# Patient Record
Sex: MALE | Race: WHITE | Employment: FULL TIME | URBAN - METROPOLITAN AREA
[De-identification: names, ages, dates, MRNs, and addresses within clinical notes are randomized per-mention and may not be internally consistent; named-entity substitution may affect disease eponyms.]

---

## 2022-02-11 ENCOUNTER — HOSPITAL ENCOUNTER (EMERGENCY)
Age: 67
Discharge: HOME | End: 2022-02-11
Payer: COMMERCIAL

## 2022-02-11 ENCOUNTER — APPOINTMENT (OUTPATIENT)
Dept: GENERAL RADIOLOGY | Age: 67
End: 2022-02-11
Payer: COMMERCIAL

## 2022-02-11 VITALS
RESPIRATION RATE: 16 BRPM | BODY MASS INDEX: 35.12 KG/M2 | WEIGHT: 265 LBS | OXYGEN SATURATION: 95 % | HEIGHT: 73 IN | HEART RATE: 108 BPM | SYSTOLIC BLOOD PRESSURE: 138 MMHG | DIASTOLIC BLOOD PRESSURE: 100 MMHG | TEMPERATURE: 98 F

## 2022-02-11 DIAGNOSIS — S66.912A STRAIN OF LEFT WRIST, INITIAL ENCOUNTER: Primary | ICD-10-CM
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
## 2022-02-11 PROCEDURE — 99284 EMERGENCY DEPT VISIT MOD MDM: CPT

## 2022-02-11 PROCEDURE — 73130 X-RAY EXAM OF HAND: CPT

## 2022-02-11 PROCEDURE — 73110 X-RAY EXAM OF WRIST: CPT

## 2022-02-11 ASSESSMENT — PAIN DESCRIPTION - PAIN TYPE: TYPE: ACUTE PAIN

## 2022-02-11 ASSESSMENT — PAIN DESCRIPTION - ORIENTATION: ORIENTATION: LEFT

## 2022-02-11 ASSESSMENT — PAIN DESCRIPTION - LOCATION: LOCATION: WRIST

## 2022-02-11 ASSESSMENT — PAIN SCALES - GENERAL: PAINLEVEL_OUTOF10: 8

## 2022-02-11 NOTE — Clinical Note
Sylvia Duarte was seen and treated in our emergency department on 2/11/2022. He may return to work on 02/13/2022. You may return back to work light duty or whenever your work deems necessary. Please follow-up with Workmen's Comp. to determine work status. If you have any questions or concerns, please don't hesitate to call.       Zoran Reed PA-C

## 2022-02-11 NOTE — ED PROVIDER NOTES
[address] [place] [address]  Department of Emergency Medicine   ED  Encounter Note  Admit Date/RoomTime: 2022  3:17 PM  ED Room: 36/36    NAME: Skyler Day  : 1955  MRN: [id number]     Chief Complaint:  Wrist Injury (left, injured when grabbing box and felt snap in anterior wrist / workers comp)    History of Present Illness       Skyler Day is a 61 y.o. old male who presents to the emergency department by private vehicle, for non-traumatic Left wrist pain which occured 1 week(s) prior to arrival.  The complaint is due to re-injury of previously injured area. Patient has no prior history of pain/injury with regards to today's visit and Patient  has a prior history of pain to mentioned area related to today's visit. Patient states \"I work as a [employer] man I have always had problems with this wrist because I pick everything up that is heavy with my left wrist including boxes and points. \"  Patient states last week he was pushing and pulling with that wrist and had some pain therefore he put on his wrist brace that he always has but today he was using his right wrist to guide some heavy coins that started to get out of control so he reopen caught everything with his left wrist and felt a pop/pull with severe pain. \"  Patient did not hit his head or lose consciousness. He is right handed. The patients tetanus status is up to date. Since onset the symptoms have been persistent. His pain is aggraveated by any movement and relieved by nothing. He denies any head injury, headache, loss of consciousness, confusion, dizziness, neck pain, chest pain, abdominal pain, back pain, blurred vision, nausea, vomiting, fever or chills. ROS   Pertinent positives and negatives are stated within HPI, all other systems reviewed and are negative. Past Medical History:  has a past medical history of Hyperlipidemia and Kidney stone.     Surgical History:  has no past surgical history on file.    Social History:  reports that he has never smoked. He does not have any smokeless tobacco history on file. He reports that he does not drink alcohol and does not use drugs. Family History: family history is not on file. Allergies: Iodine    Physical Exam   Oxygen Saturation Interpretation: Normal.        ED Triage Vitals [02/11/22 1513]   BP Temp Temp Source Pulse Resp SpO2 Height Weight   (!) 138/100 98 °F (36.7 °C) Temporal 108 16 95 % 6' 1\" (1.854 m) 265 lb (120.2 kg)         Constitutional:  Alert, development consistent with age. Neck:  Normal ROM. Supple. Non-tender. Left Wrist: diffusely across carpal bones. Tenderness:  mild. Swelling: Mild. Deformity: no deformity observed/palpated. ROM: full range with pain. Skin:  no wounds, erythema. No pain noted over anatomic snuffbox. Patient does have sharp shooting pain through wrist into forearm. No evidence of ecchymosis, injury, deformity or cellular       Neurovascular: Motor deficit: none. Sensory deficit:   none. Pulse deficit: none. Capillary refill: normal.  Left Elbow: diffusely across elbow            Tenderness: none              Swelling: None. Deformity: no deformity observed/palpated. ROM: full range of motion. Skin:  no wounds, erythema, or swelling. Left Hand: all metacarpals             Tenderness: none              Swelling: None. Deformity: no deformity observed/palpated. ROM: full range of motion. Skin:  no wounds, erythema, or swelling. Lymphatics: No lymphangitis or adenopathy noted. Neurological:  Oriented. Motor functions intact. Lab / Imaging Results   (All laboratory and radiology results have been personally reviewed by myself)  Labs:  No results found for this visit on 02/11/22. Imaging:   All Radiology results interpreted by Radiologist unless otherwise noted. XR WRIST LEFT (MIN 3 VIEWS)   Final Result   No osseous abnormality involving left wrist or hand. XR HAND LEFT (MIN 3 VIEWS)   Final Result   No osseous abnormality involving left wrist or hand. ED Course / Medical Decision Making     Medications   tetanus & diphtheria toxoids (adult) 5-2 LFU injection 0.5 mL (0.5 mLs IntraMUSCular Not Given 2/11/22 2839)        Consult:   none    Procedure(s):  None    MDM:     Patient is a 19-year-old male is presenting to emergency department with repetitive injury. Patient states last week he injured his wrist and wearing a wrist brace. Patient states since then is continued to hurt and then today he caught a heavy bag of coins due to him working at [employer]. \"  Patient states he felt a sharp pop. Imaging was obtained based on moderate suspicion for fracture / bony abnormality, dislocation, retained foreign body, joint effusion as per history/physical findings. Patient had multiple x-rays which were found to be negative for any acute fracture or dislocation. Velcro wrist splint was fitted. Patient was given the name of an orthopedic specialist to follow-up with. Patient was told to use rest, ice elevation alternate Tylenol and ibuprofen every 6-8 hours with food. Patient will to follow-up with Workmen's Comp. to this being a Workmen's Comp. injury. Patient was advised alternate Tylenol and ibuprofen every 6 hours with food. Patient voiced understanding and agree with the plan management. Plan is subsequently for symptom control, limited use and immobilization with outpatient follow-up with pcp as instructed in d/c instructions. Patient was explicitly instructed on specific signs and symptoms on which to return to the emergency room for. Patient was instructed to return to the ER for any new or worsening symptoms. Additional discharge instructions were given verbally. All questions were answered.  Patient is comfortable and agreeable with discharge plan. Patient in no acute distress and non-toxic in appearance. Plan of Care/Counseling:  Brayden Sanchez PA-C reviewed today's visit with the patient in addition to providing specific details for the plan of care and counseling regarding the diagnosis and prognosis. Questions are answered at this time and are agreeable with the plan. Assessment      1. Strain of left wrist, initial encounter    2. Injury caused by lifting due to sudden strenuous movement, initial encounter      Plan   Disposition:   Discharged home. Patient condition is stable    New Medications     New Prescriptions    No medications on file     Electronically signed by Brayden Sanchez PA-C   DD: 2/11/22  **This report was transcribed using voice recognition software. Every effort was made to ensure accuracy; however, inadvertent computerized transcription errors may be present.   END OF ED PROVIDER NOTE        Brayden Sanchez PA-C  02/11/22 8887

## 2022-02-11 NOTE — Clinical Note
Alyssa Pierre was seen and treated in our emergency department on 2/11/2022. He may return to work on 02/13/2022. You may return back to work light duty or whenever your work deems necessary. Please follow-up with Workmen's Comp. to determine work status. If you have any questions or concerns, please don't hesitate to call.       Zoran Reed PA-C

## 2022-08-08 ENCOUNTER — OFFICE VISIT (OUTPATIENT)
Dept: ORTHOPEDIC SURGERY | Age: 67
End: 2022-08-08
Payer: COMMERCIAL

## 2022-08-08 VITALS — BODY MASS INDEX: 34.01 KG/M2 | RESPIRATION RATE: 20 BRPM | HEIGHT: 74 IN | WEIGHT: 265 LBS

## 2022-08-08 DIAGNOSIS — S69.92XA INJURY OF LEFT WRIST, INITIAL ENCOUNTER: Primary | ICD-10-CM

## 2022-08-08 PROCEDURE — 99203 OFFICE O/P NEW LOW 30 MIN: CPT | Performed by: ORTHOPAEDIC SURGERY

## 2022-08-08 PROCEDURE — 1123F ACP DISCUSS/DSCN MKR DOCD: CPT | Performed by: ORTHOPAEDIC SURGERY

## 2022-08-08 RX ORDER — CYCLOBENZAPRINE HCL 10 MG
10 TABLET ORAL DAILY
COMMUNITY
Start: 2022-07-27

## 2022-08-08 NOTE — PROGRESS NOTES
SYSTEMS:  CONSTITUTIONAL:  negative  EYES:  negative  HEENT:  negative  RESPIRATORY:  negative  CARDIOVASCULAR:  negative  GASTROINTESTINAL:  negative  INTEGUMENT/BREAST:  negative  HEMATOLOGIC/LYMPHATIC:  negative  ALLERGIC/IMMUNOLOGIC:  negative  ENDOCRINE:  negative  MUSCULOSKELETAL: Left wrist pain  NEUROLOGICAL: Mild numbness and tingling  BEHAVIOR/PSYCH:  negative    PHYSICAL EXAM:    VITALS:  Resp 20   Ht 6' 2\" (1.88 m)   Wt 265 lb (120.2 kg)   BMI 34.02 kg/m²   CONSTITUTIONAL:  awake, alert, cooperative, no apparent distress, and appears stated age  EYES:  Lids and lashes normal, pupils equal.  ENT:  Normocephalic, without obvious abnormality, atraumatic. NECK:  Supple, symmetrical, trachea midline. LUNGS: Equal chest rise bilaterally  CARDIOVASCULAR:  2+ radial pulses, extremities warm and well perfused  ABDOMEN: Nondistended  CHEST: Equal chest rise bilaterally  GENITAL/URINARY:  deferred  NEUROLOGIC:  Awake, alert, oriented to name, place and time. MUSCULOSKELETAL:    Left upper extremity: Non-tender about the shoulder and elbow with good ROM. + tinel's of the cubital tunnel, - tenderness over the lacertus. + tinels of the carpal tunnel, - durkans, - finkelsteins, + CMC grind, - tenderness over the A1 pulleys with no active triggering. Full flexion and extension of the fingers with EPL intact. - wartenbergs and cross finger testing, APB strength 5/5 with no atrophy. Median, ulnar, radial n intact to light touch. Brisk capillary refill. Gross motor 5/5. Positive liftoff with side to side differences in radial and ulnar deviation of the [facility] joint. Positive pain at the [facility] joint and volar radial wrist to palpation. Palpable FCR tendon proximal to the wrist however diminishes crossing the wrist joint.     DATA:    CBC:   Lab Results   Component Value Date/Time    WBC 14.1 01/07/2016 08:15 AM    RBC 5.00 01/07/2016 08:15 AM    HGB 15.7 01/07/2016 08:15 AM    HCT 47.5 01/07/2016 08:15 AM    MCV 94.9 patient. 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 His outside MRI images were not able to be reviewed. Examination is consistent with weakness of the FCR tendon and to concern for possible underlying injury to this tendon. This would be best evaluated on the completed MRI. He will obtain the images and have the disc dropped off in the office. Discussed possible injections and bracing. If approved by [employer]. and injection was be helpful for both diagnostic as well as potentially therapeutic intervention. Concur with the findings and plan as documented.     Kumar Villafana MD  8/8/2022

## 2022-08-11 ENCOUNTER — CLINICAL DOCUMENTATION (OUTPATIENT)
Dept: ORTHOPEDIC SURGERY | Age: 67
End: 2022-08-11

## 2022-08-11 NOTE — PROGRESS NOTES
Patient did drop off his MRI disc from [facility]. I did review the study dated February 24, 2022. MRI of the left wrist was reviewed. Coronal, sagittal, and axial images both T1 and T2 weighted sequences with the area of interest marked with a volar capsule were reviewed. Clinical concern was for possible FCR tendon pathology. This tendon was visualized proximally as well as at its distal insertion. However over the area of maximal tenderness the tendon was not clearly visualized. This concern for the clinical suspicion of tendon rupture. There is underlying extensive arthritis present within the STT joint as well as thumb CMC joint. This also corresponds with area of maximal tenderness. Personal interpretation of the imaging studies is consistent with underlying arthritis of the thumb basal joint and STT joint as well as probable rupture of his FCR tendon.     Electronically signed by Frederick Steven MD on 8/11/2022 at 7:47 AM

## 2023-01-25 ENCOUNTER — HOSPITAL ENCOUNTER (OUTPATIENT)
Age: 68
Discharge: HOME | End: 2023-01-27